# Patient Record
Sex: MALE | Race: BLACK OR AFRICAN AMERICAN | NOT HISPANIC OR LATINO | URBAN - METROPOLITAN AREA
[De-identification: names, ages, dates, MRNs, and addresses within clinical notes are randomized per-mention and may not be internally consistent; named-entity substitution may affect disease eponyms.]

---

## 2018-02-13 ENCOUNTER — EMERGENCY (EMERGENCY)
Facility: HOSPITAL | Age: 83
LOS: 1 days | Discharge: HOME | End: 2018-02-13
Attending: EMERGENCY MEDICINE | Admitting: EMERGENCY MEDICINE
Payer: MEDICARE

## 2018-02-13 VITALS
TEMPERATURE: 98 F | DIASTOLIC BLOOD PRESSURE: 68 MMHG | OXYGEN SATURATION: 98 % | RESPIRATION RATE: 18 BRPM | HEART RATE: 79 BPM | SYSTOLIC BLOOD PRESSURE: 150 MMHG

## 2018-02-13 VITALS
TEMPERATURE: 98 F | HEART RATE: 92 BPM | OXYGEN SATURATION: 97 % | SYSTOLIC BLOOD PRESSURE: 149 MMHG | DIASTOLIC BLOOD PRESSURE: 89 MMHG | RESPIRATION RATE: 18 BRPM

## 2018-02-13 PROCEDURE — 99283 EMERGENCY DEPT VISIT LOW MDM: CPT

## 2018-02-13 PROCEDURE — 72170 X-RAY EXAM OF PELVIS: CPT | Mod: 26

## 2018-02-13 PROCEDURE — 71046 X-RAY EXAM CHEST 2 VIEWS: CPT | Mod: 26

## 2018-02-13 PROCEDURE — 72110 X-RAY EXAM L-2 SPINE 4/>VWS: CPT | Mod: 26

## 2018-02-13 RX ORDER — ACETAMINOPHEN 500 MG
650 TABLET ORAL ONCE
Qty: 0 | Refills: 0 | Status: COMPLETED | OUTPATIENT
Start: 2018-02-13 | End: 2018-02-13

## 2018-02-13 RX ADMIN — Medication 650 MILLIGRAM(S): at 13:28

## 2018-02-13 RX ADMIN — Medication 650 MILLIGRAM(S): at 12:00

## 2018-02-13 NOTE — ED PROVIDER NOTE - MEDICAL DECISION MAKING DETAILS
atraumatic posterior shoulder and para lumbar pain, not currently present, given h/o cancer, will check basics, alk phos and get chest x-ray and lumboscaral spine films, tylenol, re-assess

## 2018-02-13 NOTE — ED ADULT NURSE NOTE - OBJECTIVE STATEMENT
Pt rcvd to room 13 c/o neck pain radiating down back into abd x 2 weeks. Denies recent falls, injuries, heavy lifting, back sx, numbness/tingling, weakness. Hx: HTN, on clopidrogel pt unsure why. + ROM observed, + sensation. No neuro deficits observed. steady gait. VSS. In NAD. Awaiting MD la. Will continue to monitor.

## 2018-02-13 NOTE — ED PROVIDER NOTE - ATTENDING CONTRIBUTION TO CARE
Locurto  pt with 2 weeks intermittent atraumatic pain  lt lateral neck  left low back  ? worse whrn he gets up in the morning  no assoc bowel/bladder sxs  no weakness or numbness  no fevers  Took nothing for the pain at home  no new meds  on no statin  exam  pt in NAD  WD/WN  clear lungs  card RRR abd nontender no palpable organomegaly  nl strenth and sensation b/l  no central spine tenderness  no CVAT  no rib tenderness Locurto  pt with 2 weeks intermittent atraumatic pain  lt lateral neck  left low back  ? worse whrn he gets up in the morning  no assoc bowel/bladder sxs  no weakness or numbness  no fevers  Took nothing for the pain at home  no new meds  on no statin  exam  pt in NAD  WD/WN  clear lungs  card RRR abd nontender no palpable organomegaly  nl strength and sensation b/l  no central spine tenderness  no CVAT  no rib tenderness  plain films pelvis/L/S spine gegative  pt declined labwork will follow with PMD

## 2018-02-13 NOTE — SOCIAL WORK INITIAL EVALUATION ADULT - PLAN
Spoke with patient and spouse at bedside in ED.  Patient is an 83 year old, , AA, Shinto male.  Patient is alert and oriented x4.  Patient reports he is independent with all ADL's, drives and has no need for any services or community resources at this time.

## 2018-02-13 NOTE — ED ADULT TRIAGE NOTE - CHIEF COMPLAINT QUOTE
p/t c/o of neck pain radiating to mid back for few days p/t denies any chest pain denies sob no trauma p/t ambulatory

## 2018-02-13 NOTE — ED PROVIDER NOTE - OBJECTIVE STATEMENT
83m with pmh of htn, prostate ca s/p seeds in past p/w intermittent left posterior shoulder and left para-lumbar pain x 1 week. no trauma. not currently present; has not taken any meds; no cp/sob/n/v/dysuria/cough/cold

## 2019-01-01 ENCOUNTER — EMERGENCY (EMERGENCY)
Facility: HOSPITAL | Age: 84
LOS: 1 days | End: 2019-01-01
Attending: EMERGENCY MEDICINE | Admitting: EMERGENCY MEDICINE
Payer: MEDICARE

## 2019-01-01 ENCOUNTER — EMERGENCY (EMERGENCY)
Facility: HOSPITAL | Age: 84
LOS: 1 days | Discharge: HOME | End: 2019-01-01
Attending: EMERGENCY MEDICINE | Admitting: EMERGENCY MEDICINE
Payer: MEDICARE

## 2019-01-01 VITALS
OXYGEN SATURATION: 100 % | RESPIRATION RATE: 19 BRPM | HEART RATE: 89 BPM | SYSTOLIC BLOOD PRESSURE: 167 MMHG | DIASTOLIC BLOOD PRESSURE: 46 MMHG

## 2019-01-01 VITALS
HEART RATE: 77 BPM | RESPIRATION RATE: 18 BRPM | SYSTOLIC BLOOD PRESSURE: 169 MMHG | OXYGEN SATURATION: 98 % | TEMPERATURE: 97 F | DIASTOLIC BLOOD PRESSURE: 66 MMHG

## 2019-01-01 LAB
ANISOCYTOSIS BLD QL: SLIGHT — SIGNIFICANT CHANGE UP
BASE EXCESS BLDV CALC-SCNC: -13.7 MMOL/L — SIGNIFICANT CHANGE UP
BASOPHILS # BLD AUTO: 0.05 K/UL — SIGNIFICANT CHANGE UP (ref 0–0.2)
BASOPHILS NFR BLD AUTO: 0.5 % — SIGNIFICANT CHANGE UP (ref 0–2)
BASOPHILS NFR SPEC: 0 % — SIGNIFICANT CHANGE UP (ref 0–2)
BLASTS # FLD: 0 % — SIGNIFICANT CHANGE UP (ref 0–0)
BLOOD GAS VENOUS - CREATININE: SIGNIFICANT CHANGE UP MG/DL (ref 0.5–1.3)
BLOOD GAS VENOUS - FIO2: 100 — SIGNIFICANT CHANGE UP
BURR CELLS BLD QL SMEAR: PRESENT — SIGNIFICANT CHANGE UP
CHLORIDE BLDV-SCNC: 111 MMOL/L — HIGH (ref 96–108)
EOSINOPHIL # BLD AUTO: 0.06 K/UL — SIGNIFICANT CHANGE UP (ref 0–0.5)
EOSINOPHIL NFR BLD AUTO: 0.6 % — SIGNIFICANT CHANGE UP (ref 0–6)
EOSINOPHIL NFR FLD: 2 % — SIGNIFICANT CHANGE UP (ref 0–6)
GAS PNL BLDV: 135 MMOL/L — LOW (ref 136–146)
GIANT PLATELETS BLD QL SMEAR: PRESENT — SIGNIFICANT CHANGE UP
GLUCOSE BLDV-MCNC: 255 MG/DL — HIGH (ref 70–99)
HCO3 BLDV-SCNC: 12 MMOL/L — LOW (ref 20–27)
HCT VFR BLD CALC: 30.2 % — LOW (ref 39–50)
HCT VFR BLDV CALC: 29.8 % — LOW (ref 39–51)
HGB BLD-MCNC: 9.5 G/DL — LOW (ref 13–17)
HGB BLDV-MCNC: 9.6 G/DL — LOW (ref 13–17)
IMM GRANULOCYTES NFR BLD AUTO: 9.4 % — HIGH (ref 0–1.5)
LACTATE BLDV-MCNC: 9.1 MMOL/L — CRITICAL HIGH (ref 0.5–2)
LYMPHOCYTES # BLD AUTO: 4.58 K/UL — HIGH (ref 1–3.3)
LYMPHOCYTES # BLD AUTO: 49.2 % — HIGH (ref 13–44)
LYMPHOCYTES NFR SPEC AUTO: 39.4 % — SIGNIFICANT CHANGE UP (ref 13–44)
MCHC RBC-ENTMCNC: 23 PG — LOW (ref 27–34)
MCHC RBC-ENTMCNC: 31.5 % — LOW (ref 32–36)
MCV RBC AUTO: 73.1 FL — LOW (ref 80–100)
METAMYELOCYTES # FLD: 6.1 % — HIGH (ref 0–1)
MICROCYTES BLD QL: SIGNIFICANT CHANGE UP
MONOCYTES # BLD AUTO: 0.41 K/UL — SIGNIFICANT CHANGE UP (ref 0–0.9)
MONOCYTES NFR BLD AUTO: 4.4 % — SIGNIFICANT CHANGE UP (ref 2–14)
MONOCYTES NFR BLD: 1 % — LOW (ref 2–9)
MYELOCYTES NFR BLD: 4.1 % — HIGH (ref 0–0)
NEUTROPHIL AB SER-ACNC: 41.4 % — LOW (ref 43–77)
NEUTROPHILS # BLD AUTO: 3.33 K/UL — SIGNIFICANT CHANGE UP (ref 1.8–7.4)
NEUTROPHILS NFR BLD AUTO: 35.9 % — LOW (ref 43–77)
NEUTS BAND # BLD: 3 % — SIGNIFICANT CHANGE UP (ref 0–6)
NRBC # BLD: 1 /100WBC — SIGNIFICANT CHANGE UP
NRBC # FLD: 0.05 K/UL — SIGNIFICANT CHANGE UP (ref 0–0)
OTHER - HEMATOLOGY %: 0 — SIGNIFICANT CHANGE UP
PCO2 BLDV: 57 MMHG — HIGH (ref 41–51)
PH BLDV: 7.05 PH — CRITICAL LOW (ref 7.32–7.43)
PLATELET # BLD AUTO: 194 K/UL — SIGNIFICANT CHANGE UP (ref 150–400)
PLATELET COUNT - ESTIMATE: NORMAL — SIGNIFICANT CHANGE UP
PMV BLD: 9.9 FL — SIGNIFICANT CHANGE UP (ref 7–13)
PO2 BLDV: 40 MMHG — SIGNIFICANT CHANGE UP (ref 35–40)
POIKILOCYTOSIS BLD QL AUTO: SLIGHT — SIGNIFICANT CHANGE UP
POTASSIUM BLDV-SCNC: 3.9 MMOL/L — SIGNIFICANT CHANGE UP (ref 3.4–4.5)
PROMYELOCYTES # FLD: 0 % — SIGNIFICANT CHANGE UP (ref 0–0)
RBC # BLD: 4.13 M/UL — LOW (ref 4.2–5.8)
RBC # FLD: 16.8 % — HIGH (ref 10.3–14.5)
REVIEW TO FOLLOW: YES — SIGNIFICANT CHANGE UP
SAO2 % BLDV: 53.1 % — LOW (ref 60–85)
SCHISTOCYTES BLD QL AUTO: SLIGHT — SIGNIFICANT CHANGE UP
SMUDGE CELLS # BLD: PRESENT — SIGNIFICANT CHANGE UP
VARIANT LYMPHS # BLD: 3 % — SIGNIFICANT CHANGE UP
WBC # BLD: 9.3 K/UL — SIGNIFICANT CHANGE UP (ref 3.8–10.5)
WBC # FLD AUTO: 9.3 K/UL — SIGNIFICANT CHANGE UP (ref 3.8–10.5)

## 2019-01-01 PROCEDURE — 99292 CRITICAL CARE ADDL 30 MIN: CPT | Mod: 25,GC

## 2019-01-01 PROCEDURE — 92950 HEART/LUNG RESUSCITATION CPR: CPT

## 2019-01-01 PROCEDURE — 73030 X-RAY EXAM OF SHOULDER: CPT | Mod: 26,RT

## 2019-01-01 PROCEDURE — 99291 CRITICAL CARE FIRST HOUR: CPT | Mod: 25,GC

## 2019-01-01 PROCEDURE — 20610 DRAIN/INJ JOINT/BURSA W/O US: CPT | Mod: RT,GC

## 2019-01-01 PROCEDURE — 99284 EMERGENCY DEPT VISIT MOD MDM: CPT | Mod: 25,GC

## 2019-01-01 PROCEDURE — 31500 INSERT EMERGENCY AIRWAY: CPT

## 2019-01-01 PROCEDURE — 71045 X-RAY EXAM CHEST 1 VIEW: CPT | Mod: 26

## 2019-01-01 RX ORDER — KETOROLAC TROMETHAMINE 30 MG/ML
15 SYRINGE (ML) INJECTION ONCE
Qty: 0 | Refills: 0 | Status: DISCONTINUED | OUTPATIENT
Start: 2019-01-01 | End: 2019-01-01

## 2019-01-01 RX ORDER — TRIAMCINOLONE 4 MG
40 TABLET ORAL ONCE
Qty: 0 | Refills: 0 | Status: DISCONTINUED | OUTPATIENT
Start: 2019-01-01 | End: 2019-01-01

## 2019-01-01 RX ORDER — ACETAMINOPHEN 500 MG
650 TABLET ORAL ONCE
Qty: 0 | Refills: 0 | Status: COMPLETED | OUTPATIENT
Start: 2019-01-01 | End: 2019-01-01

## 2019-01-01 RX ADMIN — Medication 650 MILLIGRAM(S): at 11:30

## 2019-01-01 RX ADMIN — Medication 15 MILLIGRAM(S): at 11:30

## 2019-04-11 PROBLEM — C61 MALIGNANT NEOPLASM OF PROSTATE: Chronic | Status: ACTIVE | Noted: 2018-02-13

## 2019-04-11 PROBLEM — I10 ESSENTIAL (PRIMARY) HYPERTENSION: Chronic | Status: ACTIVE | Noted: 2018-02-13

## 2019-04-11 NOTE — PROCEDURE NOTE - NSPOSTCAREGUIDE_GEN_A_CORE
Verbal/written post procedure instructions were given to patient/caregiver Instructed patient/caregiver regarding signs and symptoms of infection/Verbal/written post procedure instructions were given to patient/caregiver

## 2019-04-11 NOTE — ED PROVIDER NOTE - CLINICAL SUMMARY MEDICAL DECISION MAKING FREE TEXT BOX
84M p/w subacute R shoulder pain, feels like he can't move the shoulder. Pt recalls no inciting trauma. Plan for xray, pain control, reassess.

## 2019-04-11 NOTE — ED ADULT NURSE NOTE - CHIEF COMPLAINT QUOTE
states " I am having pain in my right shoulder and right arm started since this morning" patient woke up from sleep and started to c/o pain. h/o HTN, prostate Ca

## 2019-04-11 NOTE — ED ADULT NURSE NOTE - OBJECTIVE STATEMENT
Received pt. in room 27 alert and oriented x 4, presenting to the ER complaining of right shoulder pain and swelling. Pt. have a history of HTN. Prostate cancer and stated " I woke up this morning and my right shoulder was paining me very much". Right shoulder with edema , pt. have difficulty moving arm . Right arm warm to touch, mobile, positive capillary refill. Medicated as ordered will continue to monitor.

## 2019-04-11 NOTE — ED PROVIDER NOTE - PROGRESS NOTE DETAILS
SERGIO BLUM MD: D/W patient and family concern regarding subsequent frozen shoulder and the offer for a steroid injection with lidocaine to reduce pain and inflammation and increase ROM.  Risks benefits and alternatives discussed.  Patient and family agree with intraarticular injection. SERIGO BLUM MD: Significant ROM improvement.  D/W patient and family and demonstrated ROM exercises including demonstrated pendulum swing while holing edge of the bed or stable furniture and crawling fingers up the wall.  Also showed full AAROM.  Agrees to follow instructions and f/u with ortho ASAP to prevent frozen shoulder (adhesive capsulitis).

## 2019-04-11 NOTE — ED PROVIDER NOTE - ATTENDING CONTRIBUTION TO CARE
SERGIO BLUM, MD: Attending Attestation: Dr. Barber   I have personally performed a history and physical examination of the patient and discussed management with the resident as well as the patient.  I reviewed the resident's note and agree with the documented findings and plan of care.  I have authored and modified critical sections of the Provider Note, including but not limited to HPI, Physical Exam and MDM.       PHYSICAL EXAM:   Vital signs reviewed.   GENERAL: Patient is awake and alert and in no acute distress.  Non-toxic appearing.  A+Ox4   HEAD:  Airway patent.  No oropharyngeal edema.  No stridor.  Auricles are normal.     EYES: EOM grossly intact, conjunctiva non-injected and sclera clear   NECK: Supple, No vertebral point tenderness to palpation.   CHEST/LUNG: Lungs clear to auscultation bilaterally; no wheeze, no rhonchi,  no rales.     HEART: Regular rate and rhythm;    ABDOMEN: Soft, non-tender to palpation.  No rebound/no guarding.  Bowel sounds present x 4.    MSK/EXTREMITIES: No clubbing or cyanosis. Back is nontender, with no vertebral point tenderness.  Significantly limited ROM in the RIGHT shoulder with mild diffuse bony tenderness to palpation.  Motion is limited to 50 degrees and abd to 30 degrees with PROM.   NEURO: Neurologically grossly intact.   No obvious deficits.    PSYCH: Psychiatrically normal mood and affect.  No apparent risk to self or others.     DR. BARBER, ATTENDING MD:  I performed a face to face bedside interview with patient regarding history of present illness, review of symptoms and past medical history. I completed an independent physical exam.  I have discussed patient's plan of care with the team of health care providers.   I agree with note as stated above, having amended the EMR as needed to reflect my findings. I have discussed the assessment and plan of care.  This includes during the time I functioned as the attending physician for this patient.

## 2019-04-11 NOTE — ED PROVIDER NOTE - OBJECTIVE STATEMENT
84M hx htn, hld, p/w 9/10 R shoulder pain x2-3 days, worse with movement, pt cant recall no inciting trauma to the arm, denies lifting anything heavy. Per wife at bedside he only started complaining about it this AM but pt insists its been going on "a few days", worse this AM. Pt cannot remember what he was doing when it started. Pt denies f/c, cough, cp, sob, n/v/d, urinary sx, black/tarry stools.

## 2019-05-22 NOTE — ED ADULT NURSE NOTE - NS ED NURSE RECORD ANOTHER HT AND WT
I have personally performed a face to face diagnostic evaluation on this patient. I have reviewed the ACP note and agree with the history, exam and plan of care, except as noted. No

## 2019-07-28 NOTE — ED ADULT NURSE NOTE - OBJECTIVE STATEMENT
adiel RN: 85yo m received to [facility] as notification for cardiac arrest. pt told family he did not feel well, then collapsed around 2130 this PM. EMS arrived, began CPR and intubated approx 2145, pt in vfib/vtach, shocked twice. pt received amioderone and epi w/EMS. pt arrives w/ 18g to L wrist, additional 18g placed to R forearm. pt required to be extubated then reintubated 2/2 faulty balloon in ET tube. see cardiac arrest sheet for meds, CPR, shock progression. Time of death called 2342. family at bedside, emotional support given. organ donation called.

## 2019-07-28 NOTE — ED ADULT TRIAGE NOTE - CHIEF COMPLAINT QUOTE
pt brought in as a notification for cardiac arrest, arrives intubated, Faustino in place, directly to TRC

## 2019-07-29 LAB
ALBUMIN SERPL ELPH-MCNC: 2.7 G/DL — LOW (ref 3.3–5)
ALP SERPL-CCNC: 123 U/L — HIGH (ref 40–120)
ALT FLD-CCNC: 152 U/L — HIGH (ref 4–41)
ANION GAP SERPL CALC-SCNC: 21 MMO/L — HIGH (ref 7–14)
AST SERPL-CCNC: 209 U/L — HIGH (ref 4–40)
BILIRUB SERPL-MCNC: 0.4 MG/DL — SIGNIFICANT CHANGE UP (ref 0.2–1.2)
BUN SERPL-MCNC: 9 MG/DL — SIGNIFICANT CHANGE UP (ref 7–23)
CALCIUM SERPL-MCNC: 8.5 MG/DL — SIGNIFICANT CHANGE UP (ref 8.4–10.5)
CHLORIDE SERPL-SCNC: 108 MMOL/L — HIGH (ref 98–107)
CO2 SERPL-SCNC: 10 MMOL/L — CRITICAL LOW (ref 22–31)
CREAT SERPL-MCNC: 1.07 MG/DL — SIGNIFICANT CHANGE UP (ref 0.5–1.3)
GLUCOSE SERPL-MCNC: 274 MG/DL — HIGH (ref 70–99)
NT-PROBNP SERPL-SCNC: 2341 PG/ML — SIGNIFICANT CHANGE UP
POTASSIUM SERPL-MCNC: 4.1 MMOL/L — SIGNIFICANT CHANGE UP (ref 3.5–5.3)
POTASSIUM SERPL-SCNC: 4.1 MMOL/L — SIGNIFICANT CHANGE UP (ref 3.5–5.3)
PROT SERPL-MCNC: 6.8 G/DL — SIGNIFICANT CHANGE UP (ref 6–8.3)
SODIUM SERPL-SCNC: 139 MMOL/L — SIGNIFICANT CHANGE UP (ref 135–145)
TROPONIN T, HIGH SENSITIVITY: 63 NG/L — CRITICAL HIGH (ref ?–14)

## 2019-07-29 NOTE — ED PROVIDER NOTE - OBJECTIVE STATEMENT
84 year old male brought in in cardiac arrest. Was in [facility] until this evening when he was found by his wife unconscious, seen normal in the last few minutes. CPR initiated by EMS in the next few minutes, intubated in the field, shocked in the field twice w/out rosc. Was given amio 300 in the field and epi per protocol. Chest compressions ongoing on arrival to ED 25-30 minutes after arrest.

## 2019-07-29 NOTE — ED PROVIDER NOTE - PHYSICAL EXAMINATION
Gen: cardiac arrest  HENT: Normocephalic, atraumatic.   Eyes: very sluggish. 3mm  Resp: CHRIS breath sounds, no crackles  CV: cardiac arrest, no pulse  Abd: soft  Skin: warm  Neuro: cardiac arrest, not moving. not breathing on his own

## 2019-07-29 NOTE — ED PROVIDER NOTE - ATTENDING CONTRIBUTION TO CARE
deanna: 84 year old male was in [facility] until this evening when he was found by his wife unconscious; he was last seen well several minutes earlier. First responders arrived several minutes later (no CPR was being performed PTA) and shocked patient twice w/o ROSC. ALS protocols initiated; pt intubated and there wasn't ROSC. Pt eventually transported to [facility]; received rounds of epi and 300mg amiodorone pre-hospital.   On arrival, pt intubated, with hannah in place and compressions being performed.   Soon after, the balloon to the ET tube was found to have a leak and pt re-intubated in the ED with good bilateral breath sounds.   There were multiple protocols initiated as clinical situation changed. Pt received multiple rounds of epi and eventually an epi drip. Multiple attempts at defribillation for v tack and v fib. There was transient ROSC with  sbp>100 which was not sustained.  There was use of neosynephrine, dopamine at different times in attempts to obtain or sustain a pulse. The ph was 7.05 and 4 amps of bicarb given with transient rosc. CXR: ards like picture. k was 3.9. Family kept apprised of changing clinical situation and came to bedside at times during the code ( at their request).   Eventually there was no further response to meds and efforts and pt . deanna: 84 year old male was in [facility] until this evening when he was found by his wife unconscious; he was last seen well several minutes earlier. First responders arrived several minutes later (no CPR was being performed PTA) and shocked patient twice w/o ROSC. ALS protocols initiated; pt intubated and there wasn't ROSC. Pt eventually transported to [facility]; received rounds of epi and 300mg amiodorone pre-hospital.   On arrival, pt intubated, with hannah in place and compressions being performed.   Soon after, the balloon to the ET tube was found to have a leak and pt re-intubated in the ED with good bilateral breath sounds.   There were multiple protocols initiated as clinical situation changed. Pt received multiple rounds of epi and eventually an epi drip. Multiple attempts at defribillation for v tack and v fib. There was transient ROSC with  sbp>100 which was not sustained.  There was use of neosynephrine, dopamine at different times in attempts to obtain or sustain a pulse. The ph was 7.05 and 4 amps of bicarb given with transient rosc. CXR: ards like picture. k was 3.9. Family kept apprised of changing clinical situation and came to bedside at times during the code ( at their request).   Eventually there was no further response to meds and efforts and pt ..

## 2019-07-29 NOTE — ED PROVIDER NOTE - CLINICAL SUMMARY MEDICAL DECISION MAKING FREE TEXT BOX
84 year old male brought in in cardiac arrest. Was in [facility] until this evening when he was found by his wife unconscious, seen normal in the last few minutes. CPR initiated by EMS in the next few minutes, intubated in the field, shocked in the field twice w/out rosc. Was given amio 300 in the field and epi per protocol. Chest compressions ongoing on arrival to ED 25-30 minutes after arrest.   See attending note for further details of code.   Time of death called at 11:42pm. ME called, not accepted. Family declined autopsy.
